# Patient Record
Sex: MALE | Race: WHITE
[De-identification: names, ages, dates, MRNs, and addresses within clinical notes are randomized per-mention and may not be internally consistent; named-entity substitution may affect disease eponyms.]

---

## 2018-07-29 ENCOUNTER — HOSPITAL ENCOUNTER (INPATIENT)
Dept: HOSPITAL 96 - M.ERS | Age: 58
LOS: 5 days | Discharge: HOME | DRG: 338 | End: 2018-08-03
Attending: FAMILY MEDICINE | Admitting: FAMILY MEDICINE
Payer: MEDICARE

## 2018-07-29 VITALS — WEIGHT: 189.6 LBS | BODY MASS INDEX: 29.76 KG/M2 | HEIGHT: 67.01 IN

## 2018-07-29 VITALS — SYSTOLIC BLOOD PRESSURE: 120 MMHG | DIASTOLIC BLOOD PRESSURE: 72 MMHG

## 2018-07-29 DIAGNOSIS — F17.210: ICD-10-CM

## 2018-07-29 DIAGNOSIS — K35.2: Primary | ICD-10-CM

## 2018-07-29 DIAGNOSIS — K35.3: ICD-10-CM

## 2018-07-29 DIAGNOSIS — Z79.2: ICD-10-CM

## 2018-07-29 DIAGNOSIS — Z79.899: ICD-10-CM

## 2018-07-29 DIAGNOSIS — R65.11: ICD-10-CM

## 2018-07-29 DIAGNOSIS — M54.9: ICD-10-CM

## 2018-07-29 DIAGNOSIS — G89.29: ICD-10-CM

## 2018-07-29 DIAGNOSIS — J98.11: ICD-10-CM

## 2018-07-29 DIAGNOSIS — N17.0: ICD-10-CM

## 2018-07-29 LAB
ABSOLUTE MONOCYTES: 1.1 THOU/UL (ref 0–1.2)
ALBUMIN SERPL-MCNC: 2.9 G/DL (ref 3.4–5)
ALP SERPL-CCNC: 105 U/L (ref 46–116)
ALT SERPL-CCNC: 16 U/L (ref 30–65)
ANION GAP SERPL CALC-SCNC: 10 MMOL/L (ref 7–16)
AST SERPL-CCNC: 12 U/L (ref 15–37)
BILIRUB SERPL-MCNC: 0.5 MG/DL
BILIRUB UR-MCNC: NEGATIVE MG/DL
BUN SERPL-MCNC: 47 MG/DL (ref 7–18)
CALCIUM SERPL-MCNC: 8.4 MG/DL (ref 8.5–10.1)
CHLORIDE SERPL-SCNC: 95 MMOL/L (ref 98–107)
CO2 SERPL-SCNC: 29 MMOL/L (ref 21–32)
COLOR UR: YELLOW
CREAT SERPL-MCNC: 1.4 MG/DL (ref 0.6–1.3)
GLUCOSE SERPL-MCNC: 125 MG/DL (ref 70–99)
GRANULOCYTES NFR BLD MANUAL: 90 %
HCT VFR BLD CALC: 44.3 % (ref 42–52)
HGB BLD-MCNC: 14.6 GM/DL (ref 14–18)
INR PPP: 1.1
KETONES UR STRIP-MCNC: NEGATIVE MG/DL
LIPASE: 52 U/L (ref 73–393)
LYMPHOCYTES # BLD: 0.8 THOU/UL (ref 0.8–5.3)
LYMPHOCYTES NFR BLD AUTO: 4 %
MCH RBC QN AUTO: 30.2 PG (ref 26–34)
MCHC RBC AUTO-ENTMCNC: 33 G/DL (ref 28–37)
MCV RBC: 91.5 FL (ref 80–100)
MONOCYTES NFR BLD: 6 %
MPV: 9.2 FL. (ref 7.2–11.1)
NEUTROPHILS # BLD: 17 THOU/UL (ref 1.6–8.1)
NUCLEATED RBCS: 0 /100WBC
PLATELET # BLD EST: ADEQUATE 10*3/UL
PLATELET COUNT*: 217 THOU/UL (ref 150–400)
POTASSIUM SERPL-SCNC: 3.5 MMOL/L (ref 3.5–5.1)
PROT SERPL-MCNC: 7.1 G/DL (ref 6.4–8.2)
PROT UR QL STRIP: (no result)
PROTHROMBIN TIME: 11.1 SECONDS (ref 9.2–11.5)
RBC # BLD AUTO: 4.84 MIL/UL (ref 4.5–6)
RBC # UR STRIP: (no result) /UL
RDW-CV: 13.8 % (ref 10.5–14.5)
SODIUM SERPL-SCNC: 134 MMOL/L (ref 136–145)
SP GR UR STRIP: 1.02 (ref 1–1.03)
TOXIC GRANULES BLD QL SMEAR: (no result)
URINE CLARITY: CLEAR
URINE GLUCOSE-RANDOM: NEGATIVE
URINE LEUKOCYTES-REFLEX: NEGATIVE
URINE NITRITE-REFLEX: NEGATIVE
UROBILINOGEN UR STRIP-ACNC: 0.2 E.U./DL (ref 0.2–1)
WBC # BLD AUTO: 18.9 THOU/UL (ref 4–11)

## 2018-07-30 VITALS — DIASTOLIC BLOOD PRESSURE: 67 MMHG | SYSTOLIC BLOOD PRESSURE: 108 MMHG

## 2018-07-30 VITALS — SYSTOLIC BLOOD PRESSURE: 108 MMHG | DIASTOLIC BLOOD PRESSURE: 67 MMHG

## 2018-07-30 VITALS — DIASTOLIC BLOOD PRESSURE: 65 MMHG | SYSTOLIC BLOOD PRESSURE: 115 MMHG

## 2018-07-30 VITALS — DIASTOLIC BLOOD PRESSURE: 52 MMHG | SYSTOLIC BLOOD PRESSURE: 116 MMHG

## 2018-07-30 VITALS — SYSTOLIC BLOOD PRESSURE: 108 MMHG | DIASTOLIC BLOOD PRESSURE: 64 MMHG

## 2018-07-30 VITALS — SYSTOLIC BLOOD PRESSURE: 123 MMHG | DIASTOLIC BLOOD PRESSURE: 57 MMHG

## 2018-07-30 LAB
ALBUMIN SERPL-MCNC: 2.5 G/DL (ref 3.4–5)
ALP SERPL-CCNC: 90 U/L (ref 46–116)
ALT SERPL-CCNC: 12 U/L (ref 30–65)
ANION GAP SERPL CALC-SCNC: 10 MMOL/L (ref 7–16)
AST SERPL-CCNC: 13 U/L (ref 15–37)
BACTERIA-REFLEX: (no result) /HPF
BILIRUB SERPL-MCNC: 0.5 MG/DL
BILIRUB UR-MCNC: NEGATIVE MG/DL
BUN SERPL-MCNC: 40 MG/DL (ref 7–18)
CALCIUM SERPL-MCNC: 7.4 MG/DL (ref 8.5–10.1)
CHLORIDE SERPL-SCNC: 101 MMOL/L (ref 98–107)
CO2 SERPL-SCNC: 26 MMOL/L (ref 21–32)
COLOR UR: YELLOW
CREAT SERPL-MCNC: 1.1 MG/DL (ref 0.6–1.3)
GLUCOSE SERPL-MCNC: 105 MG/DL (ref 70–99)
HCT VFR BLD CALC: 41 % (ref 42–52)
HGB BLD-MCNC: 13.5 GM/DL (ref 14–18)
KETONES UR STRIP-MCNC: NEGATIVE MG/DL
MCH RBC QN AUTO: 30.1 PG (ref 26–34)
MCHC RBC AUTO-ENTMCNC: 33 G/DL (ref 28–37)
MCV RBC: 91.3 FL (ref 80–100)
MPV: 9.4 FL. (ref 7.2–11.1)
PLATELET COUNT*: 214 THOU/UL (ref 150–400)
POTASSIUM SERPL-SCNC: 3.6 MMOL/L (ref 3.5–5.1)
PROT SERPL-MCNC: 5.3 G/DL (ref 6.4–8.2)
PROT UR QL STRIP: (no result)
RBC # BLD AUTO: 4.49 MIL/UL (ref 4.5–6)
RBC # UR STRIP: (no result) /UL
RBC #/AREA URNS HPF: (no result) /HPF (ref 0–2)
RDW-CV: 13.4 % (ref 10.5–14.5)
SODIUM SERPL-SCNC: 137 MMOL/L (ref 136–145)
SP GR UR STRIP: 1.01 (ref 1–1.03)
SQUAMOUS: (no result) /LPF (ref 0–3)
URINE CLARITY: (no result)
URINE GLUCOSE-RANDOM: NEGATIVE
URINE LEUKOCYTES-REFLEX: (no result)
URINE NITRITE-REFLEX: NEGATIVE
URINE WBC-REFLEX: (no result) /HPF (ref 0–5)
UROBILINOGEN UR STRIP-ACNC: 0.2 E.U./DL (ref 0.2–1)
WBC # BLD AUTO: 16.1 THOU/UL (ref 4–11)

## 2018-07-30 PROCEDURE — 0DTJ4ZZ RESECTION OF APPENDIX, PERCUTANEOUS ENDOSCOPIC APPROACH: ICD-10-PCS | Performed by: ANESTHESIOLOGY

## 2018-07-30 PROCEDURE — 0W9G4ZZ DRAINAGE OF PERITONEAL CAVITY, PERCUTANEOUS ENDOSCOPIC APPROACH: ICD-10-PCS | Performed by: ANESTHESIOLOGY

## 2018-07-30 NOTE — NUR
PT ARRIVED FROM ED AT AROUND 0100. PT WAS ACCOMPANIED BY SOME FAMILY, ABLE TO
WALK TO THE BED WITH SOME LIMPING NOTED FROM PT LEFT KNEE. ABLE TO MAKE NEEDS
KNOWN, ANSWERED QUESTIONS APPROPRIATELY. C/O ABDOMINAL PAIN AND SOME ABDOMINAL
SWELLING NOTED. PRN PAIN MEDICATIONS GIVEN PER P.O. WILL CONTINUE TO MONITOR.

## 2018-07-30 NOTE — NUR
PT RETURNED FROM PACU AROUND 1442. VSS. CONTINUOUS O2 SATURATION IN PLACE, 02
SATS >90% ON 3L PER NC. IV TO LEFT WRIST INTACT AND INFUSING IVF. CARDIAC
MONITOR PLACED TRACING SR. PT REPORTS PAIN TO ABDOMEN AND HAS RECEIVED IV PAIN
MEDICATION WITH PARTIAL RELIEF. PT ENCOURAGED TO GET OUT OF BED TO FACILITATE
PASSING GAS AND EASE SORENESS. PT ABLE TO AMBULATE TO BATHROOM WITH ASSITANCE
AND OXYGEN DURING SHIFT CHANGE TO VOID. FAMILY AT BEDSIDE. PT REMAINS NPO
EXCEPT ICE CHIPS DUE TO BOWEL ILLEUS. 3 LAP SITES TO ABDOMEN NOTED - NO S/S OF
INFECTION. BELLY REAMINS DISTENDED AND TENDER, BOWEL SOUNDS PRESENT. PT
CURRENTLY RESTING IN BED. FALL PRECAUTIONS IN PLACE. CALL LIGHT IS WITHIN
REACH. HOURLY ROUNDING PERFORMED.

## 2018-07-30 NOTE — NUR
Pt is A&O. Resides at home with his wife. Independent with ADLS. No DME. No hx
of HH or SNF. Supportive family. Surgery today. Goal is home at dc. Following.

## 2018-07-30 NOTE — NUR
RECEIVED REPORT FROM SELMA AVENDAÑO. ASSUMED CARE OF PT AROUND 0730. PT A&O X4.
VSS, SLIGHT TEMP .4 NOTED. O2 SAT 93% ON 2L PER NC. CARDIAC MONITOR IN
PLACE TRACING SR. AM ASSESSMENT AND VITALS COMPLETED AS CHARTED. IV TO LEFT AC
INTACT AND INFUSING IVF. PT REPORTS ABDOMINAL PAIN 10/10, IV PAIN MEDICATION
GIVEN WITH LITTLE RELIEF. BELLY FIRM, DISTENDED AND TENDER. BOWEL SOUNDS
PRESENT IN ALL 4 QUADRANTS. FAMILY AT BEDSIDE. PT NPO FOR SURGERY TODAY. PT
ABLE TO USE URINAL AT THE BEDSIDE. PACU ARRIVED TO  PT AROUND 1115 -
ANTIBIOTICS SENT. CONSENTS IN CHART. PREOPERATIVE CHECKLIST COMPLETE. WILL
AWAIT PT RETURN TO UNIT.

## 2018-07-31 VITALS — DIASTOLIC BLOOD PRESSURE: 66 MMHG | SYSTOLIC BLOOD PRESSURE: 113 MMHG

## 2018-07-31 VITALS — DIASTOLIC BLOOD PRESSURE: 77 MMHG | SYSTOLIC BLOOD PRESSURE: 122 MMHG

## 2018-07-31 VITALS — SYSTOLIC BLOOD PRESSURE: 112 MMHG | DIASTOLIC BLOOD PRESSURE: 65 MMHG

## 2018-07-31 VITALS — SYSTOLIC BLOOD PRESSURE: 130 MMHG | DIASTOLIC BLOOD PRESSURE: 65 MMHG

## 2018-07-31 VITALS — SYSTOLIC BLOOD PRESSURE: 140 MMHG | DIASTOLIC BLOOD PRESSURE: 82 MMHG

## 2018-07-31 LAB
ABSOLUTE BASOPHILS: 0 THOU/UL (ref 0–0.2)
ABSOLUTE EOSINOPHILS: 0 THOU/UL (ref 0–0.7)
ABSOLUTE MONOCYTES: 1.6 THOU/UL (ref 0–1.2)
ANION GAP SERPL CALC-SCNC: 8 MMOL/L (ref 7–16)
BASOPHILS NFR BLD AUTO: 0.1 %
BUN SERPL-MCNC: 33 MG/DL (ref 7–18)
CALCIUM SERPL-MCNC: 7.5 MG/DL (ref 8.5–10.1)
CHLORIDE SERPL-SCNC: 106 MMOL/L (ref 98–107)
CO2 SERPL-SCNC: 25 MMOL/L (ref 21–32)
CREAT SERPL-MCNC: 1 MG/DL (ref 0.6–1.3)
EOSINOPHIL NFR BLD: 0 %
GLUCOSE SERPL-MCNC: 114 MG/DL (ref 70–99)
GRANULOCYTES NFR BLD MANUAL: 84.9 %
HCT VFR BLD CALC: 38.7 % (ref 42–52)
HGB BLD-MCNC: 12.8 GM/DL (ref 14–18)
LYMPHOCYTES # BLD: 0.4 THOU/UL (ref 0.8–5.3)
LYMPHOCYTES NFR BLD AUTO: 2.9 %
MCH RBC QN AUTO: 30.7 PG (ref 26–34)
MCHC RBC AUTO-ENTMCNC: 33.1 G/DL (ref 28–37)
MCV RBC: 92.5 FL (ref 80–100)
MONOCYTES NFR BLD: 12.1 %
MPV: 9.2 FL. (ref 7.2–11.1)
NEUTROPHILS # BLD: 11.2 THOU/UL (ref 1.6–8.1)
NUCLEATED RBCS: 0 /100WBC
PLATELET COUNT*: 224 THOU/UL (ref 150–400)
POTASSIUM SERPL-SCNC: 4.5 MMOL/L (ref 3.5–5.1)
RBC # BLD AUTO: 4.18 MIL/UL (ref 4.5–6)
RDW-CV: 13.7 % (ref 10.5–14.5)
SODIUM SERPL-SCNC: 139 MMOL/L (ref 136–145)
WBC # BLD AUTO: 13.2 THOU/UL (ref 4–11)

## 2018-07-31 NOTE — CON
[address]  [address]                    CONSULTATION                  
_______________________________________________________________________________
 
Name:       EMERSON MORALES         Room:           [address] [place]  
.R.#:  [id number]      Account #:      [id number]  
Admission:  07/30/18     Attend Phys:    Rito Singh MD
Discharge:               Date of Birth:  [birth year]  
         Report #: [id number]
                                                                     [id number]  
_______________________________________________________________________________
THIS REPORT FOR:  //name//                      
 
CC: Shannan Singh
 
DATE OF SERVICE:  07/30/2018
 
 
CONSULTATION:  Infectious Diseases.
 
HISTORY OF PRESENT ILLNESS: Mr Morales is a 57-year-old white male who was
admitted through the ER earlier today with 3-day history of abdominal pain,
mostly in the right lower quadrant.  This was associated with nausea, vomiting,
diarrhea, then constipation, bloating, anorexia and even some difficulty with
urination.  CT scan was suggestive of ruptured appendix.  The patient underwent
urgent surgery by Dr. Ashwini Driscoll, at which time, a ruptured appendix was
documented.  This was successfully removed.  The abdomen was thoroughly
irrigated and the patient was closed, with no drains.  Infectious Disease
consultation was requested to assist with antibiotic management.
 
PAST MEDICAL HISTORY:  Significant for back surgery approximately 17 years ago. 
The patient suffered a workplace accident.  He was not able to return to work
after that.
 
MEDICATION RECONCILIATION:  The patient's current medications include
pantoprazole 40 mg p.o. daily, enoxaparin 40 mg at bedtime, Nystatin swish and
swallow, fentanyl 25 mcg 2-3 p.r.n., Zofran 4 mg IV q. 4h. p.r.n., Zosyn 3.375
grams every 8 hours, hydralazine 10 mg IV p.r.n., docusate 100 mg daily, MiraLax
17 grams daily, Tylenol 650 mg q.6h. p.o. p.r.n. and fentanyl 50 to 100 mcg
q.2h. IV push p.r.n.
 
SOCIAL HISTORY:  The patient is  and lives with his wife.  He is disabled
by his back for the last 15 years.  He was a manager at [employer].  The patient
does smoke cigarettes between half a pack and pack and a half per day.  Denies
use of alcohol or drugs.
 
REVIEW OF SYSTEMS:  The patient says he is feeling significantly better since
surgery.  He was not complaining of fevers, chills or sweats.  No headache,
sinus congestion, sore throat or trouble swallowing.  No cough, chest pain or
shortness of breath.  No angina, syncope or palpitation.  The patient did have
nausea, vomiting, diarrhea, constipation, abdominal pain, bloating and anorexia.
 He did not have any blood from his emesis or stools.  These symptoms were
markedly improved since coming back from surgery.  , no complaints. 
Extremities, no complaints.
 
PHYSICAL EXAMINATION:
 
 
 
[place] [address]                    CONSULTATION                  
_______________________________________________________________________________
 
Name:       EMERSON MORALES         Room:           [address]#:  [id number]      Account #:      [id number]  
Admission:  07/30/18     Attend Phys:    Rito Singh MD
Discharge:               Date of Birth:  [birth year]  
         Report #: [id number]
                                                                     [id number]  
_______________________________________________________________________________
GENERAL:  On examination, the patient appears comfortable, alert, oriented, not
in any distress.
VITAL SIGNS:  Show maximum temperature after surgery 38.0 degrees Celsius.
SKIN:  Shows no rash, lesion or exanthem.  Surgical wounds look okay.
ENT EXAMINATION:  Negative.
NECK:  Supple.
MENTATION:  Appropriate and normal.
HEART:  Heart sounds normal.
LUNGS:  Clear.
ABDOMEN:.  Belly is slightly distended, soft, silent and mild-to-moderately
tender.
EXTREMITIES:  Unremarkable.  IV site looks okay.
 
LABORATORY DATA:  The white count initially was 18.9 and now is down to 16.1,
hemoglobin 13.5 and platelet 214,000.  Electrolytes, glucose are normal.  BUN
40, creatinine 1.1.  Liver function tests are normal.  Urinalysis showed greater
than 20 red cells per high-powered field and 5-15 white cells per high-powered
field.  Cultures are pending.
 
IMPRESSION:  Acute ruptured appendicitis.
 
PLAN:  I concur with surgical removal of the source (appendix) and drainage of
any residual abscess and leaked fluid.  I concur with Zosyn as empiric coverage
for appendiceal bhavin, which might progress to abscess or peritonitis.  I will
continue routine postoperative care per Dr. Driscoll.  I suspect the abnormal urine
was probably due to the inflammation in the pelvis related to the appendicitis
and probably not directly related to a urinary tract infection.
 
At this point, we will continue the patient on the Zosyn as you are doing.  When
the patient is taking oral antibiotics, we can consider changing to oral
Augmentin unless surgical cultures show resistant bhavin.  I did discuss with the
patient the possible development of an abscess after ruptured appendix.  I also
discussed with the patient and his family the benefits of smoking cessation,
particularly in the immediate postoperative period as well as long-term
benefits.  He seemed receptive to this lecture.
 
We will be happy to follow the patient while he is in the hospital after the
acute surgery.
 
Thank you for requesting Infectious Disease consultation.
 
 
 
 
<ELECTRONICALLY SIGNED>
                                        By:  Ronnell Morgan MD             
07/31/18     2142
D: 07/30/18 2054_______________________________________
T: 07/31/18 0048Jofaiza Morgan MD                /nt

## 2018-07-31 NOTE — NUR
CONTINUE TO FOLLOW, PT STATES HAVING SOME PAIN TODAY, LYING IN BED.
ENCOURAGED HIM TO TRY TO TCDB Q2HR AND GAVE HIM A SPLINT BLANKET. PT STATES HE
LIVES WITH HIS WIFE AND 3 CHILDREN. HE PLANS TO RETURN HOME AT [place]. WILL FOLLOW

## 2018-07-31 NOTE — NUR
PATINET RESTING IN BED.  UP WITH ASSSIT X1 AND WALKER USAGE.  VITAL SIGNS
STABLE AND ABD SURGICAL WOUNDS CLEAN/DRY/INTACT WITH NO DRAINAGE.  PATINET DID
HAVE LARGE BOWEL MOVEMENT TO AND DIET WAS ADVANCED TO CLEAR LIQUID.  HOURLY
ROUNDING COMPLETD FOR PATIENT SAETY

## 2018-07-31 NOTE — NUR
ASSUMED PT CARE AT 1930. NURSING ASSESSMENT COMPLETED AT START OF SHIFT. PT
TRACING SINUS RHYTHM ON CARDIAC MONITOR. PRN PAIN MEDICATION ADMINISTERED X2
THIS SHIFT. PT UP TO BATHROOM X1 THIS SHIFT WITH WALKER. CALL LIGHT WITHIN
REACH, FLUIDS INFUSING. NO CONCERNS VOICED THIS SHIFT.

## 2018-08-01 VITALS — SYSTOLIC BLOOD PRESSURE: 115 MMHG | DIASTOLIC BLOOD PRESSURE: 95 MMHG

## 2018-08-01 VITALS — DIASTOLIC BLOOD PRESSURE: 83 MMHG | SYSTOLIC BLOOD PRESSURE: 149 MMHG

## 2018-08-01 VITALS — SYSTOLIC BLOOD PRESSURE: 139 MMHG | DIASTOLIC BLOOD PRESSURE: 65 MMHG

## 2018-08-01 VITALS — DIASTOLIC BLOOD PRESSURE: 74 MMHG | SYSTOLIC BLOOD PRESSURE: 139 MMHG

## 2018-08-01 VITALS — SYSTOLIC BLOOD PRESSURE: 138 MMHG | DIASTOLIC BLOOD PRESSURE: 71 MMHG

## 2018-08-01 LAB
ABSOLUTE MONOCYTES: 0.8 THOU/UL (ref 0–1.2)
ALBUMIN SERPL-MCNC: 2.2 G/DL (ref 3.4–5)
ALP SERPL-CCNC: 89 U/L (ref 46–116)
ALT SERPL-CCNC: 20 U/L (ref 30–65)
ANION GAP SERPL CALC-SCNC: 7 MMOL/L (ref 7–16)
ANISOCYTOSIS BLD QL SMEAR: (no result)
AST SERPL-CCNC: 20 U/L (ref 15–37)
BILIRUB SERPL-MCNC: 0.4 MG/DL
BUN SERPL-MCNC: 25 MG/DL (ref 7–18)
CALCIUM SERPL-MCNC: 7.7 MG/DL (ref 8.5–10.1)
CHLORIDE SERPL-SCNC: 108 MMOL/L (ref 98–107)
CO2 SERPL-SCNC: 24 MMOL/L (ref 21–32)
CREAT SERPL-MCNC: 1 MG/DL (ref 0.6–1.3)
GLUCOSE SERPL-MCNC: 91 MG/DL (ref 70–99)
GRANULOCYTES NFR BLD MANUAL: 79 %
HCT VFR BLD CALC: 39.1 % (ref 42–52)
HGB BLD-MCNC: 12.8 GM/DL (ref 14–18)
LYMPHOCYTES # BLD: 1.3 THOU/UL (ref 0.8–5.3)
LYMPHOCYTES NFR BLD AUTO: 13 %
MCH RBC QN AUTO: 30.3 PG (ref 26–34)
MCHC RBC AUTO-ENTMCNC: 32.8 G/DL (ref 28–37)
MCV RBC: 92.4 FL (ref 80–100)
MONOCYTES NFR BLD: 8 %
MPV: 8.9 FL. (ref 7.2–11.1)
NEUTROPHILS # BLD: 7.8 THOU/UL (ref 1.6–8.1)
NUCLEATED RBCS: 0 /100WBC
PLATELET # BLD EST: ADEQUATE 10*3/UL
PLATELET COUNT*: 249 THOU/UL (ref 150–400)
POIKILOCYTOSIS BLD QL SMEAR: (no result)
POTASSIUM SERPL-SCNC: 4.5 MMOL/L (ref 3.5–5.1)
PROT SERPL-MCNC: 5.6 G/DL (ref 6.4–8.2)
RBC # BLD AUTO: 4.24 MIL/UL (ref 4.5–6)
RDW-CV: 14 % (ref 10.5–14.5)
SODIUM SERPL-SCNC: 139 MMOL/L (ref 136–145)
WBC # BLD AUTO: 9.9 THOU/UL (ref 4–11)

## 2018-08-01 NOTE — NUR
VSS, PT IS PROGRESSING TOWARDS GOAL, PT HAS BEEN UP IN CHAIR AND IS TOLERATING
HIS DIET, PT HAS SOME PAIN IN BELLY, PT IS ON RA NOW AND IS MED-SURG STATUS,
HOURLY ROUNDS COMPLETED. PT IS UP WITH ONE AND WALKER

## 2018-08-01 NOTE — PATH
[address]  [address]                    PATHOLOGY RPT PROCEDURE       
_______________________________________________________________________________
 
Name:       UBALDO MORALES         Room:           [address] [place]  
..#:  [id number]      Account #:      [id number]  
Admission:  07/30/18     Date of Birth:  [birth year]  
Discharge:                             Report #:    [id number]
                                                         Path Case #: [id number]
_______________________________________________________________________________
 
[facility] Accession Number: [id number]
.                                                                01
Material submitted:                                        .
APPENDIX
.                                                                01
Clinical history:                                          .
Ruptured appendix, ileus
.                                                                02
**********************************************************************
Diagnosis:
Appendix:
- Disrupted, acute gangrenous appendicitis, periappendicitis and
serositis.
.
(LUCINA:mml; 8/1/18)
[facility]/08/01/2018
**********************************************************************
.                                                                02
Electronically signed:                                     .
Parmjit Norman MD, Pathologist
NPI- [id number]
.                                                                01
Gross description:                                         .
Received in formalin labeled "Ubaldo Morales, appendix" is a
disrupted, ragged, red maroon, and grey appendix received in 2 segments
(3.7 x 2.0 cm and 5.5 x 2.5 cm).  The shorter segment is consistent with
distal half and the longer, proximal half.  There is prominent serosal
exudate.  Sectioning the smaller fragment reveals dark red and yellow
parenchyma with no obvious lumen identified.  The longer segment shows
Probable obliteration (perforation) of the distal segment.  Sectioning
reveals marked transmural necrosis.  The mucosa is gray-black and focally
green.  The margin is inked black and the distal end blue.  Representative
sections are submitted A1-A3.
JBR/JBR
.                                                                02
Pathologist provided ICD-10:
K35.80
.                                                                02
CPT                                                        .
167958
***Performed at:  01
   [address], [place]  [id number]
   MD Heath Lara MD Phone:  [phone]
***Performed at:  02
   [facility]
   [address], [place], [place]  [id number]
   MD Parmjit Norman MD Phone:  [phone]

## 2018-08-01 NOTE — NUR
ASSUMED PT CARE AT 1930. NURSING ASSESSMENT COMPLETED AT START OF SHIFT. PT
VOICED NO CONCERNS THIS SHIFT. HOURLY ROUNDING COMPLETED. PT CONTINUES ON
CARDIAC MONITOR, TRACING SINUS RHYTHM, PRN PAIN MEDICATION ADMINISTERED THIS
SHIFT, SEE EMAR FOR DOCUMENTATION. FALL PRECAUTIONS IN PLACE. ABDOMINAL LAP
SITES CDI, NO S/S OF INFECTION. CALL LIGHT WITHIN REACH.

## 2018-08-01 NOTE — NUR
VSS, ASSUMED CARE IN AM, ASSESSMENT PERFORMED AND CHARTED, FALL PRECAUTIONS IN
PLACE AND CALL LIGHT IN PLACE, PT IS A&O4 ON 1L NC AND IS MED-SURG STATUS, PT
HAS PAIN IN ABDOMINE, IS UP WITH ONE AND WALKER, PT GOAL IS TO IMPROVE
BREATHING AND COME OFF O2 NEEDS, AND SIT UP IN CHAIR, WILL FOLLOW WITH PLAN
OF CARE.

## 2018-08-02 VITALS — DIASTOLIC BLOOD PRESSURE: 85 MMHG | SYSTOLIC BLOOD PRESSURE: 152 MMHG

## 2018-08-02 VITALS — SYSTOLIC BLOOD PRESSURE: 142 MMHG | DIASTOLIC BLOOD PRESSURE: 85 MMHG

## 2018-08-02 VITALS — DIASTOLIC BLOOD PRESSURE: 79 MMHG | SYSTOLIC BLOOD PRESSURE: 159 MMHG

## 2018-08-02 VITALS — SYSTOLIC BLOOD PRESSURE: 142 MMHG | DIASTOLIC BLOOD PRESSURE: 81 MMHG

## 2018-08-02 NOTE — NUR
CONTINUE TO FOLLOW, MET WITH PT AND FAMILY.  STATES FEELING MUCH BETTER AND
HOPES TO GO HOME TOMORROW.  PT HAS GOOD FAMILY SUPPORT AND DENIES DC NEEDS.

## 2018-08-02 NOTE — OP
[address]  [address]                    OPERATIVE REPORT              
_______________________________________________________________________________
 
Name:       EMERSON MORALES         Room:           [address] [place]  
M.R.#:  [id number]      Account #:      [id number]  
Admission:  07/30/18     Attend Phys:    Rito Singh MD
Discharge:               Date of Birth:  [birth year]  
         Report #: [id number]
                                                                     [id number]  
_______________________________________________________________________________
THIS REPORT FOR:  //name//                      
 
CC: Shannan Singh
 
DATE OF SERVICE:  07/30/2018
 
 
PREOPERATIVE DIAGNOSIS:  Perforated appendicitis.
 
POSTOPERATIVE DIAGNOSIS:  Perforated appendicitis.
 
OPERATIVE PROCEDURE:  Laparoscopic appendectomy with drainage of intra-abdominal
abscess.
 
ANESTHESIA:  General endotracheal with 0.5% Marcaine infiltrated in the wound
site.
 
DESCRIPTION OF PROCEDURE:  The patient was placed under general endotracheal
anesthesia, and the abdomen was shaved, prepped and draped in a sterile fashion.
 A timeout taken.  Antibiotics administered.  I began by infiltrating in the
infraumbilical crease with 0.5% Marcaine.  I then used the pickups and made a
transverse incision in the infraumbilical crease and used pickups and cautery to
dissect down to the anterior abdominal wall with the aid of S retractors.  The
fascia was lifted up, cleared with cautery and then divided with a fresh scalpel
blade and then blunt dissection with a mosquito into the peritoneal cavity was
accomplished without injury to underlying abdominal viscera.  An 0 PDS was
looped through this incision site, and a size 12 Seda trocar was placed into
the peritoneal cavity and secured at the skin.  Insufflation with carbon dioxide
_____ was completed and a 5 mm 0 degree scope was inserted.  The right lower
quadrant was inspected and had some purulence and adhesions from the ruptured
appendix.  The right upper abdomen infiltrated with 0.5% Marcaine, made a small
stab and incision there and placed a 5 mm trocar there and with the aid of the
irrigating device and the camera, I was able to tease away the right lower
quadrant tissues to unroof the inflamed appendix and tease away enough clearance
to the suprapubic area to place a third port, which was then again infiltrated
in the suprapubic region.  A small stab incision and a 5 mm bladeless trocar
were placed at the operative site.  The patient then was placed in Trendelenburg
and left lateral decubitus position.  The appendix, which was necrotic at its
tip, but still inflamed at its base and at the cecal junction, was slowly and
carefully teased and manipulated.  Using a ligature, I dissected the
mesoappendix until the appendix could be lifted up and the appendiceal cecal
base could be completely exposed.  _____  Endo-DARIN 30 was brought on to the
field with a white load placed across the base of the appendiceal cecal junction
and carefully fired freeing the appendix from its attachment.  I then removed
the stapler, brought in an Endopouch and placed the appendix in several pieces
 
 
 
[place] [address]                    OPERATIVE REPORT              
_______________________________________________________________________________
 
Name:       EMERSON MORALSE         Room:           [address] [place]  
..#:  [id number]      Account #:      [id number]  
Admission:  07/30/18     Attend Phys:    Rito Singh MD
Discharge:               Date of Birth:  [birth year]  
         Report #: [id number]
                                                                     [id number]  
_______________________________________________________________________________
and placed it in the pouch, closed it and retrieved it from the umbilical
incision site.  Then, I replaced the trocar and then inspected the abdomen and
did 1500 mL irrigation of the lower abdomen and upper abdomen and aspirated as
much of the free fluid as I could and the fluid started coming back clear.  I
ended by irrigation.  I then removed all trocars, instruments, deflated
pneumoperitoneum and placed the patient back in neutral position.  The 0 PDS was
tied and then infiltrated with 0.5% Marcaine.  The four incisions were then
closed with buried 4-0 PDS sutures and sealed with Dermabond.
 
ESTIMATED BLOOD LOSS:  10 mL.
 
Sponge, instrument counts correct.  The patient was returned to recovery in
stable condition.
 
 
 
 
 
 
 
 
 
 
 
 
 
 
 
 
 
 
 
 
 
 
 
 
 
 
 
 
 
 
 
<ELECTRONICALLY SIGNED>
                                        By:  Ashwini Driscoll MD            
08/02/18     0742
D: 07/30/18 1329_______________________________________
T: 07/30/18 1519Ashwini Driscoll MD               /nt

## 2018-08-02 NOTE — NUR
ASSUMED CARE OF PT THIS AM AROUND 0715- MED SURG STATUS IN PLACE AND
MAINTAINED- UPON ASSESSMENT PT NOTED TO BE RESTING IN BED- PT A&O X4-
CONTINENT OF BOWEL AND BLADDER- UP SBA WITH RW FOR SAFETY- LCTA, RESP EVEN AND
UN-LABORED- VSS, O2 SAT 95% ON RA- ABDOMEN SOFT/ROUND/SLIGHT TENDERNESS NOTED-
3 ABD INCISSIONS NOTED, KAREN AND HEALING WELL-PT REPORTS TO HAVE HAD BM THIS
AM- IV NOTED TO LEFT FA INTACT, IVF INFUSSING AS PRESCIBED- IV ABT GIVEN AS
PRESCIBED-DIET ADVANCED TO REGULAR THIS AM, GOOD PO INTAKE NOTED WITH
BREAKFAST- PT DENIES ANY C/O PAIN/DISCOMFORT AT THIS TIME- CALL LIGHT AND
PERSONAL BELONGINGS WITH IN REACH- HOURLY ROUNDS IN PLACE R/T SAFETY/NEEDS-
ALL NEEDS MET AT THIS TIME-WCTM

## 2018-08-02 NOTE — NUR
PT CURRENTLY RESTING IN BED, EATING DINNER- FAMILY AT SIDE VISITING- M/S
STATUS IN PLACE AND MAINTAINED- GOOD PO INTAKE NOTED WITH MEALS THIS SHIFT,
REPORTS TO BE TOLERATING REGULAR DIET WELL- UP TO CHAIR WITH MEALS THIS SHIFT,
TOLERATING WELL- PO PRN HYDROCODONE STARTED THIS SHIFT- PRN HYDROCODONE GIVEN
AT 1430 FOR REPORTED ABD PAIN- PT REPORTS PO PAIN MEDICATIONS TO BE WORKING
WELL- CALL LIGHT AND PERSONAL BELONGINGS WITH IN REACH- ALL NEEDS MET AT THIS
TIME-WCTM

## 2018-08-02 NOTE — NUR
END SHIFT: PT PROGRESSING TOWARDS GOALS WELL. ABLE TO GET IN AND OUT OF BED
WITHOUT ASSISTANCE. VOIDING WITHOUT DIFFICULTY. POSITIVE BS X4 QUADS, PASSING
GAS FREQUENTLY AND HAD LAST REGULAR BM YESTERDAY. PT IS TOLERATING SOFT FOODS
WELL WITH NO N/V. PT STATES HE FEELS LIKE HE IS GETTING STRONGER AND IS READY
TO GO HOME. PT STATES THAT HE HAS HIS WIFE AND OTHER FAMILY MEMBERS LIVING IN
THE HOME THAT CAN HELP HIM UNTIL HE FULLY RECOVERS. VSS. AFEBRILE. INCISIONS
ARE CDI, AND OOT WITH NO OOZING. NAVEL INCISION IS A LITTLE REDDENED AND WARM
TO TOUCH IN SURROUNDING TISSUE. PERFORMED HOURLY ROUNDING. SAFETY PRECAUTIONS
IN PLACE. CALL LIGHT IN REACH. WILL CONT TO MONITOR.

## 2018-08-03 VITALS — DIASTOLIC BLOOD PRESSURE: 84 MMHG | SYSTOLIC BLOOD PRESSURE: 144 MMHG

## 2018-08-03 VITALS — DIASTOLIC BLOOD PRESSURE: 81 MMHG | SYSTOLIC BLOOD PRESSURE: 144 MMHG

## 2018-08-03 VITALS — DIASTOLIC BLOOD PRESSURE: 81 MMHG | SYSTOLIC BLOOD PRESSURE: 158 MMHG

## 2018-08-03 NOTE — NUR
ASSUMED PT CARE AT 0730, FULL ASSESMENT DONE AS CHARTED.PT A/O X4, C/O SOME
PAIN IN ABDOMEN AT 4/10, PAIN MEDS CONTROLING IT WELL. PT REPORTS STOOLS EVERY
DAY POST OP WITH SOME LOOSE STOOLS AND PASSING GAS. GOOD APITITE, TOLERATING
FOOD WELL. PTS VSS, M/S STATUS, PT HOPES TO GO HOME TODAY, ASKING FOR PAIN MED
SCRIPT. FALL PRECAUTIONS IN PLACE, CALL LIGHT IN REACH. WILL CONTINUE WITH
PLAN OF CARE

## 2018-08-03 NOTE — NUR
ASSUMED PT CARE AT 1930. ASSESSMENT COMPLETED AS CHARTED. C/O STOMACH PAIN AND
GAVE PRN PAIN MEDICATION. UP WITH STANDBY, ABLE TO MAKE NEEDS KNOWN, WILL
CONTINUE TO MONITOR.

## 2018-09-05 NOTE — CON
[address]  [address]                    CONSULTATION                  
_______________________________________________________________________________
 
Name:       EMERSON MORALES         Room:           [address]#:  [id number]      Account #:      [id number]  
Admission:  07/30/18     Attend Phys:    Rito Singh MD
Discharge:  08/03/18     Date of Birth:  [birth year]  
         Report #: [id number]
                                                                     [id number]  
_______________________________________________________________________________
THIS REPORT FOR:  //name//                      
 
CC: Shannan Singh
 
DATE OF SERVICE:  07/30/2018
 
 
ADMITTING DIAGNOSES:  Abdominal pain, appendicitis.
 
HISTORY OF PRESENT ILLNESS:  The patient is a 57-year-old gentleman who
presented to the Emergency Department on 07/29/2018 with history of 3 days of
abdominal pain and distention, then localization to the right lower quadrant.
 
PAST MEDICAL AND SURGICAL HISTORY:  Only notable for chronic back pain and he
has had surgical history of back surgery.
 
MEDICATIONS:  Include Voltaren, Neurontin, and Flexeril.
 
ALLERGIES:  He has no known drug allergies.
 
SOCIAL HISTORY:  Some tobacco usage.
 
PHYSICAL EXAMINATION:
GENERAL:  He is a modestly obese, well-developed male.
HEAD, EARS, EYES, NOSE, AND THROAT:  Unremarkable.
NECK:  Supple.
LUNGS:  Clear.
CARDIAC:  Regular rate and rhythm without murmur.
ABDOMEN:  Some right lower quadrant tenderness, but no severe guarding.
EXTREMITIES:  No cyanosis or pitting edema.
 
LABORATORY DATA:  Upon presentation, CBC with white blood cell count of 18.9,
hemoglobin 14.6, hematocrit 44.3, platelet count of 217,000.  His electrolyte
panel:  Sodium of 134, potassium 3.5, chloride 95, carbon dioxide 29, BUN 47,
creatinine 1.4, glucose of 125.  His liver function studies were unremarkable. 
His urinalysis was negative and his imaging showed a chest x-ray with some
atelectasis and a CT scan of the abdomen showing a renal cyst and dilated loops
and small amount of fluid in the pelvis with an inflamed appendix consistent
with ruptured appendicitis.
 
IMPRESSION:  A 57-year-old gentleman who presents with diagnosis of
appendicitis, possible rupture.
 
PLAN:  I have talked to the patient about laparoscopic appendectomy, its
 
 
 
[place] [address]                    CONSULTATION                  
_______________________________________________________________________________
 
Name:       EMERSON MORALES         Room:           [address] [place]  
[facility]#:  [id number]      Account #:      [id number]  
Admission:  07/30/18     Attend Phys:    Rito Singh MD
Discharge:  08/03/18     Date of Birth:  [birth year]  
         Report #: [id number]
                                                                     [id number]  
_______________________________________________________________________________
associated risks and benefits and answered his questions.  He understands those
risks and benefits and wishes to proceed with my care.
 
 
 
 
 
 
 
 
 
 
 
 
 
 
 
 
 
 
 
 
 
 
 
 
 
 
 
 
 
 
 
 
 
 
 
 
 
 
 
 
 
 
<ELECTRONICALLY SIGNED>
                                        By:  Ashwini Driscoll MD            
09/05/18     1230
D: 08/31/18 0929_______________________________________
T: 08/31/18 1936Ashwini Driscoll MD               /nt

## 2021-04-27 ENCOUNTER — HOSPITAL ENCOUNTER (INPATIENT)
Dept: HOSPITAL 96 - M.ERS | Age: 61
LOS: 2 days | Discharge: HOME | DRG: 494 | End: 2021-04-29
Attending: INTERNAL MEDICINE | Admitting: INTERNAL MEDICINE
Payer: MEDICARE

## 2021-04-27 VITALS — DIASTOLIC BLOOD PRESSURE: 74 MMHG | SYSTOLIC BLOOD PRESSURE: 129 MMHG

## 2021-04-27 VITALS — SYSTOLIC BLOOD PRESSURE: 131 MMHG | DIASTOLIC BLOOD PRESSURE: 75 MMHG

## 2021-04-27 VITALS — BODY MASS INDEX: 36.6 KG/M2 | HEIGHT: 67.01 IN | WEIGHT: 233.2 LBS

## 2021-04-27 VITALS — DIASTOLIC BLOOD PRESSURE: 78 MMHG | SYSTOLIC BLOOD PRESSURE: 140 MMHG

## 2021-04-27 VITALS — SYSTOLIC BLOOD PRESSURE: 158 MMHG | DIASTOLIC BLOOD PRESSURE: 77 MMHG
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
## 2021-04-27 DIAGNOSIS — Z87.891: ICD-10-CM

## 2021-04-27 DIAGNOSIS — Z79.899: ICD-10-CM
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
## 2021-04-27 DIAGNOSIS — S82.852A: Primary | ICD-10-CM

## 2021-04-27 DIAGNOSIS — Z20.822: ICD-10-CM
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
## 2021-04-27 LAB
ABSOLUTE BASOPHILS: 0 THOU/UL (ref 0–0.2)
ABSOLUTE EOSINOPHILS: 0.1 THOU/UL (ref 0–0.7)
ABSOLUTE MONOCYTES: 0.8 THOU/UL (ref 0–1.2)
ALBUMIN SERPL-MCNC: 3.8 G/DL (ref 3.4–5)
ALP SERPL-CCNC: 79 U/L (ref 46–116)
ALT SERPL-CCNC: 36 U/L (ref 30–65)
ANION GAP SERPL CALC-SCNC: 8 MMOL/L (ref 7–16)
AST SERPL-CCNC: 20 U/L (ref 15–37)
BASOPHILS NFR BLD AUTO: 0.7 %
BILIRUB SERPL-MCNC: 0.3 MG/DL
BUN SERPL-MCNC: 28 MG/DL (ref 7–18)
CALCIUM SERPL-MCNC: 8.4 MG/DL (ref 8.5–10.1)
CHLORIDE SERPL-SCNC: 107 MMOL/L (ref 98–107)
CO2 SERPL-SCNC: 27 MMOL/L (ref 21–32)
CREAT SERPL-MCNC: 1 MG/DL (ref 0.6–1.3)
EOSINOPHIL NFR BLD: 1.7 %
GLUCOSE SERPL-MCNC: 132 MG/DL (ref 70–99)
GRANULOCYTES NFR BLD MANUAL: 74 %
HCT VFR BLD CALC: 40.2 % (ref 42–52)
HGB BLD-MCNC: 13.1 GM/DL (ref 14–18)
INR PPP: 1.1
LYMPHOCYTES # BLD: 0.9 THOU/UL (ref 0.8–5.3)
LYMPHOCYTES NFR BLD AUTO: 12.6 %
MCH RBC QN AUTO: 29.3 PG (ref 26–34)
MCHC RBC AUTO-ENTMCNC: 32.5 G/DL (ref 28–37)
MCV RBC: 90.1 FL (ref 80–100)
MONOCYTES NFR BLD: 11 %
MPV: 8.2 FL. (ref 7.2–11.1)
NEUTROPHILS # BLD: 5.1 THOU/UL (ref 1.6–8.1)
NUCLEATED RBCS: 0 /100WBC
PLATELET COUNT*: 238 THOU/UL (ref 150–400)
POTASSIUM SERPL-SCNC: 4.4 MMOL/L (ref 3.5–5.1)
PROT SERPL-MCNC: 7.1 G/DL (ref 6.4–8.2)
PROTHROMBIN TIME: 11.8 SECONDS (ref 9.2–11.5)
RBC # BLD AUTO: 4.46 MIL/UL (ref 4.5–6)
RDW-CV: 13.8 % (ref 10.5–14.5)
SODIUM SERPL-SCNC: 142 MMOL/L (ref 136–145)
WBC # BLD AUTO: 6.8 THOU/UL (ref 4–11)

## 2021-04-27 PROCEDURE — 0QSH04Z REPOSITION LEFT TIBIA WITH INTERNAL FIXATION DEVICE, OPEN APPROACH: ICD-10-PCS | Performed by: ORTHOPAEDIC SURGERY

## 2021-04-27 PROCEDURE — 0QSK04Z REPOSITION LEFT FIBULA WITH INTERNAL FIXATION DEVICE, OPEN APPROACH: ICD-10-PCS | Performed by: ORTHOPAEDIC SURGERY

## 2021-04-27 PROCEDURE — 0SSG0ZZ REPOSITION LEFT ANKLE JOINT, OPEN APPROACH: ICD-10-PCS | Performed by: ORTHOPAEDIC SURGERY

## 2021-04-27 NOTE — EKG
[place] [address]
Phone:  [phone]                     ELECTROCARDIOGRAM REPORT      
_______________________________________________________________________________
 
Name:         EMERSON MORALES        Room:          [address] [place] 
[facility]#:    [id number]     Account #:     [id number]  
Admission:    21    Attend Phys:   Nate Newman, 
Discharge:                Date of Birth: [birth year]  
Date of Service: 21 1236  Report #:      [id number]
        [id number]
_______________________________________________________________________________
THIS REPORT FOR:  //name//                      
 
                         [facility] ED
                                       
Test Date:    2021               Test Time:    12:36:15
Pat Name:     EMERSON MORALES     Department:   
Patient ID:   [id number]            Room:          
Gender:       M                        Technician:   CCD
:          1960               Requested By: Jonnathan Laura
Order Number: [id number] MD:   Pete Allen
                                 Measurements
Intervals                              Axis          
Rate:         71                       P:            54
[place]:           198                      QRS:          56
QRSD:         90                       T:            37
QT:           383                                    
QTc:          417                                    
                           Interpretive Statements
Sinus rhythm
Compared to ECG 2018 22:07:22
No significant changes
Electronically Signed On 2021 15:52:59 CDT by Pete Allen
[email or web address]
 
 
 
 
 
 
 
 
 
 
 
 
 
 
 
 
 
 
 
 
 
  <ELECTRONICALLY SIGNED>
                                           By: Pete Allen MD, [facility]      
  21     1552
D: 21 1236   _____________________________________
T: 21 1236   Pete Allen MD, [facility]        /EPI

## 2021-04-27 NOTE — EMS
[facility] 
[address] [place] [address]                    EMS Patient Care Report       
_______________________________________________________________________________
 
Name:       EMERSON MORALES         Room:           [address] 
MAHESH#:  [id number]      Account #:      [id number]  
Admission:  21     Attend Phys:    Nate Newman MD 
Discharge:               Date of Birth:  [birth year]  
         Report #: [id number]
                                                                     [id number]
_______________________________________________________________________________
THIS REPORT FOR:  //name//                      
 
Report Transmitted: 2021 19:28
EMS Care Summary
[facility] [employer] [facility]
Incident  @ 2021 10:33
 
Incident Location
[address] [place] [address]
 
Patient
EMERSON MORALES
Male, 60 Years
 1960
 
Patient Address
[address] [place] [address]
 
Patient History
Other,
 
Patient Allergies
No known allergies,
 
Patient Medications
None Reported,
 
Chief Complaint
ankle injury
 
Disposition
Transported No Lights/[place]
 
Dispatch Reason
Traumatic Injury
 
Transported To
[facility]
 
Narrative
Dispatched mutual aid to a residence for 59 y/o male with an ankle injury. Fire 
crew was on scene PTA. Pt. states that he was walking down steps and "twisted" 
his left ankle. Pt. left ankle was swollen and bruised with an ice pack in 
place. Poss. deformity was not clear due to swelling. Pt. had good pulse and 
 
 
 
[facility] 
[address] [place] [address]                    EMS Patient Care Report       
_______________________________________________________________________________
 
Name:       EMERSON MORALES         Room:           [address]#:  [id number]      Account #:      [id number]  
Admission:  21     Attend Phys:    Nate Newman MD 
Discharge:               Date of Birth:  [birth year]  
         Report #: [id number]
                                                                     [id number]
_______________________________________________________________________________
cap. refill to the left foot. Pt. stated his pain was 6/10 while at rest. Left 
foot and ankle were splinted prior to movement. En route IV was started. Pt. 
refused drugs for pain management. VS were stable. Pt. was transported to [place] for emergency services. 
 
Initial Vitals
@PTAP: 70,R: 20,BP: 140/90,Pain: 6/10,GCS: 15,SpO2: 94,Revised Trauma: 12,
@11:05P: 70,R: 16,BP: 144/88,Pain: 6/10,GCS: 15,SpO2: 96,Revised Trauma: 12,
@11:20P: 68,R: 16,BP: 135/92,Pain: 7/10,GCS: 15,SpO2: 95,Revised Trauma: 12,
 
Assessments
@10:57MENTAL:No Abnormalities,SKIN:No Abnormalities,HEENT:Head/Face: No 
Abnormalities,Eyes: No Abnormalities,Neck/Airway: No Abnormalities,LUNG 
SOUNDS:General: No Abnormalities,Left Upper: No Abnormalities,Right Upper: No 
Abnormalities,Left Lower: No Abnormalities,Right Lower: No 
Abnormalities,ABDOMEN:General: No Abnormalities,Left Upper: No 
Abnormalities,Right Upper: No Abnormalities,Left Lower: No Abnormalities,Right 
Lower: No Abnormalities,PELVIS//GI:No Abnormalities,EXTREMITIES:Left Leg: 
Other,Capillary Refill: Left Lower: < 2 Sec,Left Arm: No Abnormalities,Right 
Arm: No Abnormalities,Right Leg: No Abnormalities,PULSE:Pedal: 2+ 
Normal,NEURO:No Abnormalities, 
 
Impression
Injury of Ankle
 
Procedures
@11:08Saline Lock 10cc (20 ga) Site: Forearm-LeftResponse: 
UnchangedSucceeded@11:00Splint Fx/Disloc.Response: ImprovedSucceeded 
 
Timeline
PTA,BP: 140/90 M,PULSE: 70,RR: 20 R,SPO2: 94 Ox,ETCO2:  ,BG: ,PAIN: 6,GCS: 15,
10:31,Call Received
10:33,Dispatched
10:33,En Route
10:55,On Scene
10:57,At Patient
11:00,Splint Fx/Disloc.,Response: ImprovedSucceeded,
11:05,BP: 144/88 M,PULSE: 70,RR: 16 R,SPO2: 96 Ox,ETCO2:  ,BG: ,PAIN: 6,GCS: 15,
11:06,Depart Scene
11:08,Saline Lock 10cc 20 ga Site: Forearm-Left,Response: UnchangedSucceeded,
11:20,BP: 135/92 M,PULSE: 68,RR: 16 R,SPO2: 95 Ox,ETCO2:  ,BG: ,PAIN: 7,GCS: 15,
11:34,At Destination
11:38,Transfer Patient
12:09,Call Closed
12:09,[place] [address]                    EMS Patient Care Report       
_______________________________________________________________________________
 
Name:       EMERSON MORALES         Room:           [address] 
GAUTAM.#:  [id number]      Account #:      [id number]  
Admission:  21     Attend Phys:    Nate K. Trent, MD 
Discharge:               Date of Birth:  [birth year]  
         Report #: [id number]
                                                                     [id number]
_______________________________________________________________________________
Disclaimer
v1.1     Copyright 202 [employer]
This EMS Care Summary contains data elements from the applicable legal record 
(which may be displayed differently). It is designed to provide pertinent 
information for the following purposes: continuity of care, clinical quality, 
and state data reporting. The complete legal record is available to ED staff 
and administrators of the receiving hospital in [employer]s Patient Tracker. All data 
is provided "as is."

## 2021-04-28 VITALS — SYSTOLIC BLOOD PRESSURE: 131 MMHG | DIASTOLIC BLOOD PRESSURE: 69 MMHG

## 2021-04-28 VITALS — DIASTOLIC BLOOD PRESSURE: 74 MMHG | SYSTOLIC BLOOD PRESSURE: 129 MMHG

## 2021-04-28 VITALS — SYSTOLIC BLOOD PRESSURE: 118 MMHG | DIASTOLIC BLOOD PRESSURE: 73 MMHG

## 2021-04-28 VITALS — DIASTOLIC BLOOD PRESSURE: 69 MMHG | SYSTOLIC BLOOD PRESSURE: 127 MMHG

## 2021-04-28 VITALS — SYSTOLIC BLOOD PRESSURE: 149 MMHG | DIASTOLIC BLOOD PRESSURE: 66 MMHG

## 2021-04-28 LAB
ABSOLUTE BASOPHILS: 0 THOU/UL (ref 0–0.2)
ABSOLUTE EOSINOPHILS: 0 THOU/UL (ref 0–0.7)
ABSOLUTE MONOCYTES: 1.1 THOU/UL (ref 0–1.2)
ANION GAP SERPL CALC-SCNC: 9 MMOL/L (ref 7–16)
BASOPHILS NFR BLD AUTO: 0.1 %
BUN SERPL-MCNC: 21 MG/DL (ref 7–18)
CALCIUM SERPL-MCNC: 8.6 MG/DL (ref 8.5–10.1)
CHLORIDE SERPL-SCNC: 105 MMOL/L (ref 98–107)
CO2 SERPL-SCNC: 26 MMOL/L (ref 21–32)
CREAT SERPL-MCNC: 1.1 MG/DL (ref 0.6–1.3)
EOSINOPHIL NFR BLD: 0 %
GLUCOSE SERPL-MCNC: 148 MG/DL (ref 70–99)
GRANULOCYTES NFR BLD MANUAL: 82.5 %
HCT VFR BLD CALC: 36.7 % (ref 42–52)
HGB BLD-MCNC: 11.8 GM/DL (ref 14–18)
LYMPHOCYTES # BLD: 0.8 THOU/UL (ref 0.8–5.3)
LYMPHOCYTES NFR BLD AUTO: 7.6 %
MCH RBC QN AUTO: 29.1 PG (ref 26–34)
MCHC RBC AUTO-ENTMCNC: 32.2 G/DL (ref 28–37)
MCV RBC: 90.5 FL (ref 80–100)
MONOCYTES NFR BLD: 9.8 %
MPV: 8.6 FL. (ref 7.2–11.1)
NEUTROPHILS # BLD: 9.1 THOU/UL (ref 1.6–8.1)
NUCLEATED RBCS: 0 /100WBC
PLATELET COUNT*: 245 THOU/UL (ref 150–400)
POTASSIUM SERPL-SCNC: 4.3 MMOL/L (ref 3.5–5.1)
RBC # BLD AUTO: 4.05 MIL/UL (ref 4.5–6)
RDW-CV: 13.5 % (ref 10.5–14.5)
SODIUM SERPL-SCNC: 140 MMOL/L (ref 136–145)
WBC # BLD AUTO: 11 THOU/UL (ref 4–11)

## 2021-04-28 NOTE — NUR
Pt is A&O. Resides at home with wife. Independent, but states that someone is
with him 24/7, Pt has a hx of collapsing randomly. Pt has a walker and wc at
home, Pt uses rollator in the home and cane in the community. No home o2. No
hx of HH or SNF. Goal is home at dc, no needs. Anticipate dc today, Pt
declined HH.

## 2021-04-28 NOTE — NUR
PT A&OX4, VSS ON ROOM AIR, IV FLUIDS INFUSING AS ORDERED, PT NWB TO LLE. PAIN
MEDS REQUESTED AND GIVEN AS ORDERED. PT VOIDING WITHOUT DIFFICULTY. DRSG TO
LLE WITH DRAINAGE, DRSG REINFORCED. PT RESTING IN BED, WILL CONTINUE TO
MONITOR.

## 2021-04-28 NOTE — NUR
PATIENT RESTING ON RECLINER, EATING DINNER AND WATCHING TV.  DRESSING TO LEFT
ANKLE C/D/I.  IV TO LEFT WRIST SALINE LOCKED, PATENT.  DRESSING C/D/I.
TRAMADOL GIVEN AS ORDERED FOR PAIN.  ALL QUESTIONS AND CONCERNS ADDRESSED.
WILL CONTINUE TO MONITOR.

## 2021-04-29 VITALS — DIASTOLIC BLOOD PRESSURE: 75 MMHG | SYSTOLIC BLOOD PRESSURE: 132 MMHG

## 2021-04-29 VITALS — DIASTOLIC BLOOD PRESSURE: 74 MMHG | SYSTOLIC BLOOD PRESSURE: 129 MMHG

## 2021-04-29 VITALS — SYSTOLIC BLOOD PRESSURE: 129 MMHG | DIASTOLIC BLOOD PRESSURE: 74 MMHG

## 2021-04-29 NOTE — NUR
After discussion this morning it was determined that pt did not go home
yesterday because of the inability to pass stairs with PT. Pt and son state
that they can get into the house without using stairs and do not want to do
stairs with therapy again. Dr. Newman in the room for the conversation and
agrees. Pt OK to discharge without doing stairs. Pt plans to do outpatient
therapy instead of home health. Pt was escorted out of the building by
wheelchair by nursing staff.

## 2021-05-05 NOTE — OP
[address]  [address]                    OPERATIVE REPORT              
_______________________________________________________________________________
 
Name:       EMERSON MORALES         Room:           [address] [place]  
M.R.#:  [id number]      Account #:      [id number]  
Admission:  04/27/21     Attend Phys:    Nate Newman MD 
Discharge:  04/29/21     Date of Birth:  [birth year]  
         Report #: [id number]
                                                                     [id number]
_______________________________________________________________________________
THIS REPORT FOR:  
 
cc:  Shannan Morrison Ahmad W. DO Orth, Charles DO                                                   ~
 
DOC #: [id number]
Dictated by Biju Paul DO
DATE OF SURGERY: 04/27/2021
 
SURGEON:  Willy Pena DO
 
ASSISTANTS:
1.  Biju Paul DO
2.  Uziel Manriquez DO
 
PREOPERATIVE DIAGNOSIS:  Left ankle trimalleolar fracture, closed.
+
POSTOPERATIVE DIAGNOSIS:  Left ankle trimalleolar
 
OPERATION PERFORMED:  Open reduction internal fixation of left ankle 
trimalleolar fracture.
 
INDICATIONS:  The patient is a 60-year-old male who presented to the [facility] 
ED following a fall he sustained within the previous 2 hours before his 
presentation.  He notes twisting his ankle when he fell and hearing a pop.  He 
was unable to return to weightbearing and unable to ambulate following this 
injury.  Upon further evaluation in the ED, radiographs were obtained and 
demonstrate the left ankle trimalleolar fracture with displacement of the 
mortise joint.  The risks, benefits, indications, contraindications to open 
reduction internal fixation of this fracture were all discussed with the 
patient.  Risks include but are not limited to pain, infection, neurovascular 
injury, loss of reduction, hardware failure, malunion, nonunion, need for repeat
procedure, reaction to implants, inability to return to prior functional levels,
swelling, DVT/PE, myocardial arrhythmias, myocardial infarction, stroke, death, 
as well as any and all risks attributable to general anesthesia.  The patient 
verbalizes understanding of these risks and wishes to proceed with the operation
as discussed.  Informed consent was signed.
 
PREOPERATIVE ANTIBIOTICS:  Ancef 2 g IV.
 
PROCEDURE IN DETAIL:  The patient was met in the preoperative bay.  Informed 
consent for the aforementioned procedure was obtained.  He was transported back 
to the operative suite and onto a well-padded operative table.  General 
anesthesia was induced.  The left lower extremity was prepped and draped in the 
standard sterile fashion.  A surgical timeout was performed indicating the 
 
 
 
[place] [address]                    OPERATIVE REPORT              
_______________________________________________________________________________
 
Name:       EMERSON MORALES         Room:           [address]#:  [id number]      Account #:      [id number]  
Admission:  04/27/21     Attend Phys:    Nate Newman MD 
Discharge:  04/29/21     Date of Birth:  [birth year]  
         Report #: [id number]
                                                                     [id number]
_______________________________________________________________________________
 
correct patient, operative limb, operation to be performed.  All present in the 
operative suite were in agreement with proceeding as indicated.  Using 
fluoroscopic guidance, the fracture sites were marked initially laterally over 
the fibula.  A longitudinal incision was made overlying the fibula centered over
the fracture site with a scalpel through the dermis layer.  Scissors were used 
to dissect bluntly and sharply down to the level of the periosteum.  A scalpel 
was used to create a periosteal sleeve anteriorly and posteriorly over the 
fibula.  The fracture site was exposed and thoroughly irrigated and debrided of 
all interposed tissue.  Provisional reduction of the fibular fracture was 
obtained using point-to-point clamps.  A 6-hole fibular locking plate was placed
laterally over the fibula following fracture reduction and provisionally secured
into place with K-wires.  The reduction and plate placement were confirmed using
fluoroscopic guidance.  The plate was then fixed to the fibula in this reduced 
position using locking screws in the distal segment and 2 cortical screws 
achieving bicortical purchase in the proximal segment.  Fluoroscopic guidance 
was then used to confirm adequate reduction was maintained with adequate 
position of the plate.  Attention was then turned to the medial aspect of the 
ankle for fixation of the medial malleolus fracture.  Again, fluoroscopic was 
used to identify the level of the fracture.  A 15 blade scalpel was then used to
incise through the epidermis and dermis overlying the fracture site in a 
longitudinal fashion.  Scissors were used to provide both blunt and sharp 
dissection down through the subcutaneous layers down to the level of the bone.  
Scalpel dissection was then used to elevate the periosteum anteriorly and 
posteriorly to expose the fracture site.  The fracture was then thoroughly 
irrigated and debrided of all interposed tissue.  The fracture was provisionally
reduced and held into place with K-wires.  Following confirmation of reduction 
on fluoroscopy, the medial malleolar fracture was then fixed using cannulated 
screws with compression by technique.  These were 4-0 cannulated screws.  We 
then returned laterally and placed 2 syndesmotic screws through the fibular 
plate and into the tibia achieving 3 cortices of fixation with both screws.  
These were cannulated 4.0 screws.  A provisional K-wire pin was placed in the 
trajectory desired using fluoroscopic guidance.  Once adequate position of the 
wires was confirmed under fluoroscopy, they were then drilled to the appropriate
depth for screw placement.  The 4-0 cannulated syndesmotic screws were then 
placed through the fibular plate tricortically through the tibia.  Appropriate 
position was confirmed under fluoroscopic imaging.  Following fixation of the 
fibular fracture, medial malleolus fracture and placement of the syndesmotic 
screws, the ankle was then stressed in external rotation and confirmed to be 
stable.  The posterior malleolus segment was confirmed to be stable in its 
position as well and therefore not fixated with any instrumentation.  The 
surgical fields were then thoroughly irrigated with normal saline.  The 
periosteal sleeve was closed both medially and laterally over the exposed 
hardware with 0 Vicryl suture.  The subcutaneous tissue was reapproximated with 
2-0 Monocryl suture in a simple interrupted fashion and the skin layer was 
closed with a running 4-0 nylon suture.  Dressings consisted of Xeroform, 4 x 4 
 
 
 
[place] [address]                    OPERATIVE REPORT              
_______________________________________________________________________________
 
Name:       EMERSON MORALES         Room:           [address]#:  [id number]      Account #:      [id number]  
Admission:  04/27/21     Attend Phys:    Nate Newman MD 
Discharge:  04/29/21     Date of Birth:  [birth year]  
         Report #: [id number]
                                                                     [id number]
_______________________________________________________________________________
 
gauze, ABD padding, sterile soft roll followed by a nonsterile soft roll and a 
posterior Orthoglass splint was placed over the operative extremity.
 
ESTIMATED BLOOD LOSS:  20 mL
 
ANESTHESIA TYPE:  General.
 
The patient was then awoken from general anesthesia and transferred on to an 
inpatient bed and to PACU in stable condition.  I attest that Dr. Willy Pena 
was present in the operative suite for all critical parts of the procedure.
 
Willy Pena DO
TT/FLORECITA
 
D: 04/27/2021 05:49 PM
T: 04/27/2021 10:15 PM
 
 
 
 
 
 
 
 
 
 
 
 
 
 
 
 
 
 
 
 
 
 
 
 
 
 
 
<ELECTRONICALLY SIGNED>
                                        By:  Willy Pena DO              
05/05/21     1429
D: 04/27/21 1649_______________________________________
T: 04/27/21 2115Cchaz Pena DO                 /nt

## 2021-05-26 NOTE — EKG
[place] [address]
Phone:  [phone]                     ELECTROCARDIOGRAM REPORT      
_______________________________________________________________________________
 
Name:       EMERSON MORALES         Room:           [address]    [place]M [place]  
.R.#:  [id number]      Account #:      [id number]  
Admission:  18     Attend Phys:    Rito Singh MD
Discharge:               Date of Birth:  [birth year]  
         Report #: [id number]
    [id number]
_______________________________________________________________________________
THIS REPORT FOR:  //name//                      
 
                         [facility] ED
                                       
Test Date:    2018               Test Time:    22:07:22
Pat Name:     EMERSON MORALES     Department:   
Patient ID:   [id number]            Room:         [facility]
Gender:       M                        Technician:   
:          1960               Requested By: Tiffanie Paez
Order Number: [id number] MD:   Pete Allen
                                 Measurements
Intervals                              Axis          
Rate:         85                       P:            75
AR:           154                      QRS:          57
QRSD:         99                       T:            1
QT:           353                                    
QTc:          420                                    
                           Interpretive Statements
Sinus rhythm
Compared to ECG 2016 10:31:08
No significant changes
 
Electronically Signed On 2018 10:32:49 CDT by Pete Allen
[email or web address]
 
 
 
 
 
 
 
 
 
 
 
 
 
 
 
 
 
 
 
  <ELECTRONICALLY SIGNED>
                                           By: Pete Allen MD, [facility]      
  18     1032
D: 18   _____________________________________
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  Patient had MRI/MRA done which showed 1.5 x 1.4 x 1.4 cm vascular lesion in proximal medial gastrocnemius muscle.  Patient reports that since the second sclerotherapy therapy session his pain has been recurring and is sometimes 5 out of 10.  Patient reports that it gets worse with activity and ambulation and also position changes so if he is sitting for a while and then stands up he will limp.  Patient reports no visible deformity but the area is tender to touch.  Patient seen by Dr. Josue again last month from interventional radiology and referred for possible excision\par \par \par